# Patient Record
Sex: MALE | Race: WHITE | Employment: STUDENT | URBAN - METROPOLITAN AREA
[De-identification: names, ages, dates, MRNs, and addresses within clinical notes are randomized per-mention and may not be internally consistent; named-entity substitution may affect disease eponyms.]

---
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
## 2024-08-05 ENCOUNTER — OFFICE VISIT (OUTPATIENT)
Dept: FAMILY MEDICINE CLINIC | Facility: CLINIC | Age: 19
End: 2024-08-05
Payer: COMMERCIAL

## 2024-08-05 VITALS
SYSTOLIC BLOOD PRESSURE: 110 MMHG | WEIGHT: 210 LBS | BODY MASS INDEX: 24.79 KG/M2 | HEIGHT: 77 IN | DIASTOLIC BLOOD PRESSURE: 68 MMHG | HEART RATE: 64 BPM | RESPIRATION RATE: 16 BRPM | OXYGEN SATURATION: 98 %

## 2024-08-05 DIAGNOSIS — Z23 NEED FOR TDAP VACCINATION: ICD-10-CM

## 2024-08-05 DIAGNOSIS — Z00.00 WELLNESS EXAMINATION: Primary | ICD-10-CM

## 2024-08-05 DIAGNOSIS — J45.30 MILD PERSISTENT ASTHMA WITHOUT COMPLICATION (HCC): ICD-10-CM

## 2024-08-05 RX ORDER — FLUTICASONE PROPIONATE AND SALMETEROL XINAFOATE 230; 21 UG/1; UG/1
1 AEROSOL, METERED RESPIRATORY (INHALATION) 2 TIMES DAILY
Qty: 12 G | Refills: 5 | Status: SHIPPED | OUTPATIENT
Start: 2024-08-05

## 2024-08-05 NOTE — PROGRESS NOTES
HPI:   Donnie De La Cruz is a 19 year old male who presents for an Annual Health Visit.     Donnie is here for wellness check.    Has had some cough and chest pain ongoing for a while. Sometimes will wake up to coughing.    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    Has been insecure about his hips because they are wide.          Allergies:     Allergies   Allergen Reactions    Dust Mite Extract UNKNOWN    Molds & Smuts UNKNOWN    Dander UNKNOWN     CATS AND DOGS    Grass UNKNOWN       CURRENT MEDICATIONS   Current Outpatient Medications   Medication Sig Dispense Refill    fluticasone-salmeterol (ADVAIR HFA) 230-21 MCG/ACT Inhalation Aerosol Inhale 1 puff into the lungs 2 (two) times daily. 12 g 5    Albuterol Sulfate  (90 Base) MCG/ACT Inhalation Aero Soln Inhale 2 puffs into the lungs every 4 (four) hours as needed for Wheezing or Shortness of Breath (cough). 3 Inhaler 1    Beclomethasone Dipropionate (QVAR) 40 MCG/ACT Inhalation Aero Soln INHALE TWO PUFFS BY MOUTH TWICE DAILY 3 Inhaler 3      HISTORICAL INFORMATION   Past Medical History:    ADHD    ALLERGIC RHINITIS    ASTHMA      Past Surgical History:   Procedure Laterality Date    Tonsillectomy        Family History   Problem Relation Age of Onset    Anxiety Paternal Grandfather     Alcohol and Other Disorders Associated Other         mat grt uncle    Anxiety Other         blood clot      SOCIAL HISTORY   Social History     Socioeconomic History    Marital status: Single   Tobacco Use    Smoking status: Never    Smokeless tobacco: Never   Substance and Sexual Activity    Alcohol use: Never    Drug use: Never     Social Determinants of Health      Received from [facility]    Housing Stability     Social History     Social History Narrative    Not on file        REVIEW OF SYSTEMS:     Constitutional: negative  Eyes: negative  ENT:  negative  Respiratory: negative  Cardiovascular: negative  Gastrointestinal: negative  Integument/Breast: negative  Genitourinary: negative  Heme/Lymph: negative  Musculoskeletal: negative  Neurological: negative  Psych: negative  Endocrine: negative  Allergic/Immune: negative    EXAM:   /68   Pulse 64   Resp 16   Ht 6' 5\" (1.956 m)   Wt 210 lb (95.3 kg)   SpO2 98%   BMI 24.90 kg/m²    Wt Readings from Last 6 Encounters:   08/05/24 210 lb (95.3 kg)   07/27/22 205 lb (93 kg) (94%, Z= 1.57)*   07/17/22 210 lb (95.3 kg) (95%, Z= 1.68)*   06/13/22 215 lb (97.5 kg) (96%, Z= 1.79)*   12/20/21 200 lb (90.7 kg) (93%, Z= 1.50)*   08/09/21 190 lb (86.2 kg) (90%, Z= 1.30)*     * Growth percentiles are based on [facility] (Boys, 2-20 Years) data.     Body mass index is 24.9 kg/m².    General: alert, appears stated age, and cooperative  Head: Normocephalic, without obvious abnormality, atraumatic  Eyes: conjunctivae/corneas clear. PERRL, EOM's intact. Fundi benign.  Ears: normal TM's and external ear canals both ears  Nose: Nares normal. Septum midline. Mucosa normal. No drainage or sinus tenderness.  Throat: lips, mucosa, and tongue normal; teeth and gums normal  Neck: no adenopathy, no carotid bruit, no JVD, supple, symmetrical, trachea midline, and thyroid not enlarged, symmetric, no tenderness/mass/nodules  Heart: S1, S2 normal, no murmur, click, rub or gallop, regular rate and rhythm  Lungs: clear to auscultation bilaterally  Chest wall: no tenderness  Abdomen: soft, non-tender; bowel sounds normal; no masses,  no organomegaly  : deferred  Back: symmetric, no curvature. ROM normal. No CVA tenderness.  Extremities: extremities normal, atraumatic, no cyanosis or edema  Pulses: 2+ and symmetric  Skin: Skin color, texture, turgor normal. No rashes or lesions  Lymph Nodes: Cervical, supraclavicular, and axillary nodes normal.  Neurologic: Grossly normal    ASSESSMENT AND PLAN:   Donnie was seen today for  physical.    Diagnoses and all orders for this visit:    Wellness examination    Mild persistent asthma without complication (HCC)  -     fluticasone-salmeterol (ADVAIR HFA) 230-21 MCG/ACT Inhalation Aerosol; Inhale 1 puff into the lungs 2 (two) times daily.    Need for Tdap vaccination  -     TdaP (Adacel, Boostrix) [44466]      There are no Patient Instructions on file for this visit.    The patient indicates understanding of these issues and agrees to the plan.    Problem List:  Patient Active Problem List   Diagnosis    Cough    Wheezing    Allergic rhinitis due to animal (cat) (dog) hair and dander    ASTHMA    ADHD (attention deficit hyperactivity disorder), inattentive type    Generalized anxiety disorder    Depression    Closed nondisplaced fracture of shaft of clavicle with routine healing, subsequent encounter       Felix Cottrell MD  8/5/2024  5:13 PM

## 2024-08-12 ENCOUNTER — TELEPHONE (OUTPATIENT)
Dept: FAMILY MEDICINE CLINIC | Facility: CLINIC | Age: 19
End: 2024-08-12

## 2024-08-12 NOTE — TELEPHONE ENCOUNTER
May want to check with insurance as my portal shows that as a preferred level 1.    Felix Cottrell MD

## 2024-08-12 NOTE — TELEPHONE ENCOUNTER
Pt said the fluticasone-salmeterol (ADVAIR HFA) 230-21 MCG inhaler out of pocket cost is $100.  He's asking if there's a cheaper generic med that can be prescribe?   Pls advise

## 2024-08-13 NOTE — TELEPHONE ENCOUNTER
Spoke with patient and he will contact his insurance for coverage of alternate medication.  Agreed to note. He will call us back with update.

## 2025-03-24 ENCOUNTER — APPOINTMENT (OUTPATIENT)
Dept: DERMATOLOGY | Age: 20
End: 2025-03-24

## 2025-03-24 DIAGNOSIS — L21.9 SEBORRHEIC DERMATITIS: Primary | ICD-10-CM

## 2025-03-24 PROCEDURE — 99204 OFFICE O/P NEW MOD 45 MIN: CPT | Performed by: DERMATOLOGY

## 2025-03-24 RX ORDER — FLUOCINONIDE TOPICAL SOLUTION USP, 0.05% 0.5 MG/ML
SOLUTION TOPICAL
Qty: 120 ML | Refills: 1 | Status: SHIPPED | OUTPATIENT
Start: 2025-03-24

## 2025-03-24 RX ORDER — CLOBETASOL PROPIONATE 0.05 G/100ML
SHAMPOO TOPICAL
Qty: 236 ML | Refills: 1 | Status: SHIPPED | OUTPATIENT
Start: 2025-03-24

## 2025-05-29 ENCOUNTER — APPOINTMENT (OUTPATIENT)
Dept: DERMATOLOGY | Age: 20
End: 2025-05-29

## 2025-05-29 DIAGNOSIS — L21.9 SEBORRHEIC DERMATITIS: Primary | ICD-10-CM

## 2025-05-29 PROCEDURE — 99214 OFFICE O/P EST MOD 30 MIN: CPT | Performed by: DERMATOLOGY

## 2025-05-29 RX ORDER — CLOBETASOL PROPIONATE 0.05 G/100ML
SHAMPOO TOPICAL
Qty: 236 ML | Refills: 11 | Status: SHIPPED | OUTPATIENT
Start: 2025-05-29

## 2025-05-29 RX ORDER — FLUOCINONIDE TOPICAL SOLUTION USP, 0.05% 0.5 MG/ML
SOLUTION TOPICAL
Qty: 120 ML | Refills: 11 | Status: SHIPPED | OUTPATIENT
Start: 2025-05-29

## 2025-11-24 ENCOUNTER — APPOINTMENT (OUTPATIENT)
Dept: DERMATOLOGY | Age: 20
End: 2025-11-24

## (undated) NOTE — ED AVS SNAPSHOT
[facility] in [address] [address]    Phone:  [phone]    Fax:  [phone]           Yves Posey   MRN: [id number]    Department:  [facility] [place]   Date of Visit:  5/30/2017           Diagn To Check ER Wait Times:  TEXT 'ERwait' to 81122      Click [email or web address]      Or call [phone]    If you have any problems with your follow-up, please call our  at [phone].     Si usted tiene algun problema con joseph sequimiento, por I have read and understand the instructions given to me by my caregivers. 24-Hour Pharmacies        Pharmacy Address Phone Number   [facility] [address] ([address]) [place]

## (undated) NOTE — LETTER
Date: 2022    Patient Name: Ramiro Wynn  3/21/2005          To Whom it may concern:    Yarelis Kirby is diagnosed with ADHD, and is currently prescribed stimulant medication for this. Vyvanse. It would be expected for him to take this or similar medication through his college career, and likely beyond.     Sincerely,      Rodolfo Pereyra MD

## (undated) NOTE — ED AVS SNAPSHOT
[facility] [place] [facility] in [place] [address] [address]    Phone:  [phone]    Fax:  [phone]           Jose Miguel Lopez   MRN: [id number]    Department:  [facility] [place] [facility] [place]   Date of Visit:  3/8/2017           Homer Expect to receive an electronic request (by e-mail or text) to complete a self-assessment the day after your visit. You may also receive a call from our patient liason soon after your visit.  Also, some patients receive a detailed feedback survey mailed to [place] [id number]  ([address]) [address] [facility] [phone] [address] ([address] [place] [address]) [phone] [address] [place] [address] [address] (

## (undated) NOTE — LETTER
ASTHMA ACTION PLAN for Donnie De La Cruz     : 3/21/2005     Date: 24  Doctor:  Felix Cottrell MD  Phone for doctor or clinic: [facility], [address] [place] [id number] [phone]      ACT Score: 16    ACT Goal: 20 or greater    Call your provider if you require your rescue/quick reliever medication more than 2-3 times in a 24 hour period.    If you require your rescue inhaler/medication more than 2-3 times weekly, your asthma may not be under proper control and you should seek medical attention.    *Quick Relievers are Xopenex and Albuterol*    You can use the colors of a traffic light to help learn about your asthma medicines.  Year Round       1. Green - Go! % of Personal Best Peak Flow   Use controller medicine.   Breathing is good  No cough or wheeze  Can work and play Medicine How much to take When to take it    Medications       Steroid Inhalants Instructions     Beclomethasone Dipropionate (QVAR) 40 MCG/ACT Inhalation Aero Soln INHALE TWO PUFFS BY MOUTH TWICE DAILY       Sympathomimetics Instructions     Albuterol Sulfate  (90 Base) MCG/ACT Inhalation Aero Soln Inhale 2 puffs into the lungs every 4 (four) hours as needed for Wheezing or Shortness of Breath (cough).                    2. Yellow - Caution. 50-79% Personal Best Peak Flow  Use reliever medicine to keep an asthma attack from getting bad.   Cough  Quick Relievers  Wheezing  Tight Chest  Wake up at night Medicine How much to take When to take it    If symptoms are not improving in 24-48 hrs, call office for further instructions  Medications       Steroid Inhalants Instructions     Beclomethasone Dipropionate (QVAR) 40 MCG/ACT Inhalation Aero Soln INHALE TWO PUFFS BY MOUTH TWICE DAILY       Sympathomimetics Instructions     Albuterol Sulfate  (90 Base) MCG/ACT Inhalation Aero Soln Inhale 2 puffs into the lungs every 4 (four) hours as needed for Wheezing or  Shortness of Breath (cough).                    3. Red - Stop! Danger! <50% Personal Best Peak Flow  Continue Controller Medications But ADD:   Medicine not helping  Breathing is hard and fast  Nose opens wide  Can't walk  Ribs show  Can't talk well Medicine How much to take When to take it    If your symptoms do not improve in ONE hour -  go to the emergency room or call 911 immediately! If symptoms improve, call office for appointment immediately.    Albuterol inhaler 2 puffs every 20 minutes for three treatments       Don't forget:  Rinse mouth after using inhaler  Use spacer for inhaler  Remember to get your Flu vaccine every fall!    [x] Asthma Action Plan reviewed with the caregiver and patient, and a copy of the plan was given to the patient/caregiver.   [] Asthma Action Plan reviewed with the caregiver and patient on the phone, and copy mailed to patient/caregiver or sent via [employer].     Signatures:   Provider  Felix Cottrell MD Patient  Donnie Jono Caretaker